# Patient Record
Sex: FEMALE | Race: WHITE | NOT HISPANIC OR LATINO | Employment: OTHER
[De-identification: names, ages, dates, MRNs, and addresses within clinical notes are randomized per-mention and may not be internally consistent; named-entity substitution may affect disease eponyms.]

---

## 2023-07-14 ENCOUNTER — TRANSCRIBE ORDERS (OUTPATIENT)
Dept: OTHER | Age: 88
End: 2023-07-14

## 2023-07-14 DIAGNOSIS — E05.00 GRAVES' EYE DISEASE: Primary | ICD-10-CM

## 2023-10-11 ENCOUNTER — TELEPHONE (OUTPATIENT)
Dept: OPHTHALMOLOGY | Facility: CLINIC | Age: 88
End: 2023-10-11
Payer: COMMERCIAL

## 2023-10-11 NOTE — TELEPHONE ENCOUNTER
Spoke to patient.  We have records.  She will discuss with her primary about ordering TSI if they are willing.      Ebonie Elizalde on 10/11/2023 at 3:40 PM

## 2023-10-11 NOTE — TELEPHONE ENCOUNTER
[facility] Call Center    Phone Message    May a detailed message be left on voicemail: yes     Reason for Call: Other: Patient Request     Action Taken: Other: Peds Eye    Travel Screening: Not Applicable    Patient is calling in regards to appointment 10/30, received a packet of paperwork. It is missing the MARTA form and would like to have it sent to patient. Bonnie would like it mail to home address on file:    [id number] [place] [address]      Would also like a call back to clarify and confirm, please call [phone] want to make sure she is prepare and have all that is needed for appointment.

## 2023-10-24 NOTE — PROGRESS NOTES
THYROID EYE DISEASE CLINIC - NEW VISIT NOTE         Referring physician: Dr. Rodriguez    HPI:     Diagnosed with Grave's disease in her mid 20's. Presenting symptom was bulging eyes. Also had palpitation and heat intolerance. Treated with ZUNIGA and thyroidectomy in her 20's and has been on levothyroxine without issues for the last 50 years.     In March 2023, noticed that her right eye was bulging out again.     Upper eyelid surgery in 2000. 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     Denies any eyelid swelling or redness. States that the inner corner of her eye itches at times.       Thyroid history:  Diagnosed when? Approximately 59 years ago   ZUNIGA: yes at age 33   Thyroidectomy: left-sided thyroidectomy at age 29     TSI/TSH:     06/01/23 14:19   T3,TOTAL 85 - 202 ng/dL 78 (L)   THYROPEROXIDASE ANTWON <34.00 IU/mL 13.40   Thyrotropin Rec Ab 0.00 - 1.75 IU/L <1.10   TgAb+Thyroglobulin,DAYNA or LCMS 0.0 - 0.9 IU/mL 4.6 (H)   06/01/23 14:19   TSH 0.27 - 4.20 uIU/mL 0.09 (L) 1.22   T4,FREE 0.70 - 1.80 ng/dL 1.28         [facility] ONLY      Eye symptoms (since when):   Proptosis (better/worse/same since last visit): Initial    Diplopia(better/worse/same since last visit): Denies (Initial)   Eyelid retraction(better/worse/same since last visit): Yes (Initial)   Tearing(better/worse/same since last visit): Yes (Initial)   Redness (better/worse/same since last visit): Yes (Initial)   Pain (better/worse/same since last visit): Achiness behind the eyes (Initial)   Pain to move the eyes (better/worse/same since last visit): Denies (Initial)   Blurred vision: Denies (Initial)     Ocular history:   Orbital decompression: Denies  Strabismus surgery: Denies  Eyelid surgery: Yes, in 2000 (but states this is due 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      Patient has a current medication list which includes the following  prescription(s): isosorbide mononitrate, levothyroxine, levothyroxine, atorvastatin, and study - aspirin (ids# [id number]).    Patient  reports that she quit smoking about 73 years ago. Her smoking use included cigarettes. She smoked an average of .1 packs per day. She has been exposed to tobacco smoke. She has never used smokeless tobacco.      Exam:   Frank (base):23/21 (93)     Better/worse same: Initial  Strabismus (better/worse/same): Initial   Eyelid retraction (better/worse/same): Yes (Initial)    LY Score:  1. Spontaneous orbital pain.     0  2. Gaze evoked orbital pain.     0  3. Eyelid swelling due to active thyroid eye disease  0  4. Eyelid erythema.      0  5. Conjunctival redness due to active thyroid eye disease . 1  6. Chemosis.        1  7. Inflammation of caruncle OR plica.   1    Patients assessed after follow-up can be scored out of 10 by  including items 8-10.    8. Increase of > 2mm in proptosis.    N/A   9. Decrease in uniocular excursion in any direction of > 8 . N/A  10. Decrease of acuity equivalent to 1 Snellen line.  N/A    LY SCORE = 3    Lujan Diplopia Score = 0  0 = no diplopia  1 = intermittent (when tired, upon waking, end of day etc)  2 = inconstant (extreme gazes)  3 = constant      Bonnie GUILLERMINA Bowers is a 88 year old female with the following diagnoses:   1. Graves' eye disease       It is very unusual for Thyroid eye disease to recur after 60 years. I would recommend obtaining a CT orbit to make sure that there isn't something behind the RIGHT eye causing the proptosis.  If the CT is consistent with Thyroid eye disease, then will have her follow up in 6 months sooner as needed for worsening symptoms.     The natural history of thyroid eye disease was discussed. We told the patient that typically FELICIA will worsen for a period of time, then improve to some degree, and then stabilize without normalizing.  This process can take somewhere between 1 and 3 years on average.  In the  meantime, we recommended seeing the patient in the [facility] every 6 months (sooner if the patient experiences worsening vision in either eye).  Once the patient becomes stable for at least 6 months' time, we discussed that the patient may need restorative surgery or prisms.  Finally, we discussed that correcting the thyroid hormone levels does not ensure that the eyes will normalize but that it could help to some degree.                Attending Physician Attestation:  Complete documentation of historical and exam elements from today's encounter can be found in the full encounter summary report (not reduplicated in this progress note).  I personally obtained the chief complaint(s) and history of present illness.  I confirmed and edited as necessary the review of systems, past medical/surgical history, family history, social history, and examination findings as documented by others; and I examined the patient myself.  I personally reviewed the relevant tests, images, and reports as documented above.  I formulated and edited as necessary the assessment and plan and discussed the findings and management plan with the patient and family. I personally reviewed the ophthalmic test(s) associated with this encounter, agree with the interpretation(s) as documented by the resident/fellow, and have edited the corresponding report(s) as necessary.  - Jorge Luis Clayton MD  PGY-2  Department of Ophthalmology  October 30, 2023 1:57 PM     Precharting:   Nat Kelley MD   Fellow, Neuro-ophthalmology

## 2023-10-30 ENCOUNTER — HOSPITAL ENCOUNTER (OUTPATIENT)
Dept: CT IMAGING | Facility: CLINIC | Age: 88
Discharge: HOME | End: 2023-10-30
Attending: OPHTHALMOLOGY
Payer: COMMERCIAL

## 2023-10-30 ENCOUNTER — OFFICE VISIT (OUTPATIENT)
Dept: OPHTHALMOLOGY | Facility: CLINIC | Age: 88
End: 2023-10-30
Attending: OPHTHALMOLOGY
Payer: COMMERCIAL

## 2023-10-30 DIAGNOSIS — E05.00 GRAVES' EYE DISEASE: ICD-10-CM

## 2023-10-30 PROCEDURE — 92285 EXTERNAL OCULAR PHOTOGRAPHY: CPT | Performed by: OPHTHALMOLOGY

## 2023-10-30 PROCEDURE — 70480 CT ORBIT/EAR/FOSSA W/O DYE: CPT

## 2023-10-30 PROCEDURE — G0463 HOSPITAL OUTPT CLINIC VISIT: HCPCS | Mod: 25 | Performed by: OPHTHALMOLOGY

## 2023-10-30 PROCEDURE — 99204 OFFICE O/P NEW MOD 45 MIN: CPT | Mod: GC | Performed by: OPHTHALMOLOGY

## 2023-10-30 PROCEDURE — 70480 CT ORBIT/EAR/FOSSA W/O DYE: CPT | Mod: 26 | Performed by: STUDENT IN AN ORGANIZED HEALTH CARE EDUCATION/TRAINING PROGRAM

## 2023-10-30 RX ORDER — ISOSORBIDE MONONITRATE 30 MG/1
30 TABLET, EXTENDED RELEASE ORAL
COMMUNITY
Start: 2023-09-28

## 2023-10-30 RX ORDER — LEVOTHYROXINE SODIUM 100 UG/1
100 TABLET ORAL
COMMUNITY
Start: 2023-06-30

## 2023-10-30 RX ORDER — LEVOTHYROXINE SODIUM 125 UG/1
125 TABLET ORAL
COMMUNITY
Start: 2023-09-28

## 2023-10-30 RX ORDER — ATORVASTATIN CALCIUM 20 MG/1
20 TABLET, FILM COATED ORAL
COMMUNITY

## 2023-10-30 ASSESSMENT — SLIT LAMP EXAM - LIDS
COMMENTS: INFERIOR SCLERAL SHOW
COMMENTS: INFERIOR SCLERAL SHOW

## 2023-10-30 ASSESSMENT — TONOMETRY
OS_IOP_MMHG: 18
IOP_METHOD: ICARE B/B JC
OD_IOP_MMHG: 15

## 2023-10-30 ASSESSMENT — REFRACTION_WEARINGRX
OD_SPHERE: -1.25
OS_CYLINDER: +1.25
OD_CYLINDER: +1.25
OS_SPHERE: -1.25
OD_ADD: +2.75
OS_AXIS: 170
OS_ADD: +2.75
OD_AXIS: 020

## 2023-10-30 ASSESSMENT — CONF VISUAL FIELD
OS_INFERIOR_NASAL_RESTRICTION: 0
OD_INFERIOR_NASAL_RESTRICTION: 0
OS_SUPERIOR_NASAL_RESTRICTION: 0
OD_NORMAL: 1
OS_SUPERIOR_TEMPORAL_RESTRICTION: 0
METHOD: COUNTING FINGERS
OD_SUPERIOR_TEMPORAL_RESTRICTION: 0
OS_NORMAL: 1
OD_INFERIOR_TEMPORAL_RESTRICTION: 0
OD_SUPERIOR_NASAL_RESTRICTION: 0
OS_INFERIOR_TEMPORAL_RESTRICTION: 0

## 2023-10-30 ASSESSMENT — MARGIN REFLEX DISTANCE
OD_MRD2: 5
OS_MRD2: 5
OS_MRD1: 1
OD_MRD1: 1

## 2023-10-30 ASSESSMENT — VISUAL ACUITY
OS_CC: 20/20
METHOD: SNELLEN - LINEAR
OD_CC: 20/20
OS_CC+: -1
OD_CC+: -2

## 2023-10-30 ASSESSMENT — CUP TO DISC RATIO
OD_RATIO: 0.3
OS_RATIO: 0.3

## 2023-10-30 ASSESSMENT — EXTERNAL EXAM - LEFT EYE: OS_EXAM: NORMAL

## 2023-10-30 ASSESSMENT — EXTERNAL EXAM - RIGHT EYE: OD_EXAM: NORMAL

## 2023-10-31 ENCOUNTER — TELEPHONE (OUTPATIENT)
Dept: OPHTHALMOLOGY | Facility: CLINIC | Age: 88
End: 2023-10-31
Payer: COMMERCIAL

## 2023-10-31 NOTE — TELEPHONE ENCOUNTER
[facility] Call Center    Phone Message    May a detailed message be left on voicemail: yes     Reason for Call: Other: Patient stated she had a CT scan on 10/30/2023. Patient stated she is going back to [place] on 11/5/2023.   Patient would like to know how soon she can get her results.   Please call patient to discuss. Thanks!     Action Taken: Other: Peds Eye     Travel Screening: Not Applicable

## 2024-07-31 ENCOUNTER — TRANSCRIBE ORDERS (OUTPATIENT)
Dept: OTHER | Age: 89
End: 2024-07-31

## 2024-07-31 DIAGNOSIS — H57.89 THYROID EYE DISEASE: Primary | ICD-10-CM

## 2024-07-31 DIAGNOSIS — E07.9 THYROID EYE DISEASE: Primary | ICD-10-CM

## 2025-01-09 ENCOUNTER — TELEPHONE (OUTPATIENT)
Dept: OPHTHALMOLOGY | Facility: CLINIC | Age: 89
End: 2025-01-09
Payer: COMMERCIAL

## 2025-01-09 NOTE — TELEPHONE ENCOUNTER
Spoke with patient regarding rescheduling correctly with  in [facility] as patient is an established patient with . Rescheduled patient accordingly and sent reminder letter and map to confirmed address.-Per Patient